# Patient Record
(demographics unavailable — no encounter records)

---

## 2024-12-04 NOTE — PHYSICAL EXAM
[Awake] : awake [Alert] : alert [Acute Distress] : no acute distress [Mass] : no breast mass [Tender] : no tenderness [de-identified] : right groin abcess with small amount of pustular exudate elicited when squeezing but then sanguinous

## 2024-12-04 NOTE — PHYSICAL EXAM
[Awake] : awake [Alert] : alert [Acute Distress] : no acute distress [Mass] : no breast mass [Tender] : no tenderness [de-identified] : right groin abcess with small amount of pustular exudate elicited when squeezing but then sanguinous

## 2024-12-04 NOTE — CHIEF COMPLAINT
[FreeTextEntry1] : Nayla is a 47 y/o  who presents today wiith multiple complaints as follows:  1) desires evaluation of left breast abscess. She had a diagnostic right breast mammo and sono on 24 that was normal. 2) pain to her right axilla: she is s/p a diagnostic b/l mammo and sono 3/8/24 and reports drinking 3-4 cups of coffee per day 3) a right groin abcess that she went to  ED for on 24 where she was treated with Keflex 500mg q/6hours for about 6 days. She was told to f/u with gyn if it did not fully resolve.

## 2024-12-14 NOTE — PLAN
[FreeTextEntry1] : Pt will go to the ER at [facility] for further evaluation

## 2024-12-14 NOTE — HISTORY OF PRESENT ILLNESS
[FreeTextEntry8] : Pt went to [place] ER on 11/29/24 with a right groin abscess. She was prescrived cephalexin for 1 week and discharged. She had a follow-up with GYN on 12/4/24. She was told to use warm compresses.. She is here today c/o pain in the right groin and leg. She feels that it is "bone pain"

## 2024-12-14 NOTE — PHYSICAL EXAM
[No Acute Distress] : no acute distress [Well Nourished] : well nourished [Well Developed] : well developed [Well-Appearing] : well-appearing [Normal Sclera/Conjunctiva] : normal sclera/conjunctiva [PERRL] : pupils equal round and reactive to light [EOMI] : extraocular movements intact [Normal Outer Ear/Nose] : the outer ears and nose were normal in appearance [Normal Oropharynx] : the oropharynx was normal [No JVD] : no jugular venous distention [No Lymphadenopathy] : no lymphadenopathy [Supple] : supple [Thyroid Normal, No Nodules] : the thyroid was normal and there were no nodules present [No Respiratory Distress] : no respiratory distress  [No Accessory Muscle Use] : no accessory muscle use [Clear to Auscultation] : lungs were clear to auscultation bilaterally [Normal Rate] : normal rate  [Regular Rhythm] : with a regular rhythm [Normal S1, S2] : normal S1 and S2 [No Murmur] : no murmur heard [No Carotid Bruits] : no carotid bruits [No Abdominal Bruit] : a ~M bruit was not heard ~T in the abdomen [No Varicosities] : no varicosities [Pedal Pulses Present] : the pedal pulses are present [No Edema] : there was no peripheral edema [No Palpable Aorta] : no palpable aorta [No Extremity Clubbing/Cyanosis] : no extremity clubbing/cyanosis [Soft] : abdomen soft [Non Tender] : non-tender [Non-distended] : non-distended [No Masses] : no abdominal mass palpated [No HSM] : no HSM [Normal Bowel Sounds] : normal bowel sounds [Normal Posterior Cervical Nodes] : no posterior cervical lymphadenopathy [Normal Anterior Cervical Nodes] : no anterior cervical lymphadenopathy [No CVA Tenderness] : no CVA  tenderness [No Spinal Tenderness] : no spinal tenderness [No Joint Swelling] : no joint swelling [Grossly Normal Strength/Tone] : grossly normal strength/tone [No Rash] : no rash [Coordination Grossly Intact] : coordination grossly intact [No Focal Deficits] : no focal deficits [Normal Gait] : normal gait [Deep Tendon Reflexes (DTR)] : deep tendon reflexes were 2+ and symmetric [Normal Affect] : the affect was normal [Normal Insight/Judgement] : insight and judgment were intact [de-identified] : Remnant of abscess right groin. Female medical assistant present in  room during exam

## 2025-02-06 NOTE — ASSESSMENT
[FreeTextEntry1] : EKG NSR Stress urinary incontinence Diaphoresis possible due to viral illness Right leg pain on and off for months

## 2025-02-06 NOTE — HEALTH RISK ASSESSMENT
[Fair] : ~his/her~ current health as fair  [Good] : ~his/her~  mood as  good [No] : No [Never] : Never

## 2025-02-06 NOTE — PLAN
[FreeTextEntry1] : Check labs Referred to Dr. Blackmon, Urology, regarding urinary incontinence Referred to [facility], Orthopedics,  regarding chronic right leg intermittent pain Referred to Dr Glover, Colorectal, for screening colonoscopy Chest X-ray regarding diaphoresis Yearly mammogram and GYN exam.

## 2025-02-06 NOTE — HISTORY OF PRESENT ILLNESS
[FreeTextEntry1] : Here for CPE Pt woke up sweaty 3 mornings during the past week. Improved this morning. Pt is c/o intermittent pain in right leg for months Pt is c/o urinary incontinence when she coughs [de-identified] : Never a smoker. No alcohol. Last mammogram and GYN check-up last year. Hasn't had a colonoscopy. Exercises twice per week
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
## 2025-02-13 NOTE — PHYSICAL EXAM
[FreeTextEntry3] : General: well appearing person in nad, alert, pleasant; here with her children Skin: Mild thinning at central part with slight miniaturization of hair follicles. No perifollicular inflammation or scarring. Few growing hairs noted on dermoscopy Yellow scale throughout the scalp R arm with healing erosion BLE with xerosis, minimal eczematous thin plaques R inguinal fold with scar
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
## 2025-05-15 NOTE — HISTORY OF PRESENT ILLNESS
[FreeTextEntry1] : 48 yo here for bad vaginal odor. SHe has had this in the past a yr ago, this has been for 10 days . She has the copper iud.also some itching.  She also states that she is having mixed incontinance.

## 2025-05-15 NOTE — PHYSICAL EXAM
[Chaperone Declined] : Chaperone offered however refused by patient, [Appropriately responsive] : appropriately responsive [Alert] : alert [No Acute Distress] : no acute distress [Soft] : soft [Non-tender] : non-tender [Oriented x3] : oriented x3 [Labia Majora] : normal [Labia Minora] : normal [Cystocele] : a cystocele [IUD String] : an IUD string was noted [Normal] : normal [FreeTextEntry4] : brownish discharge [FreeTextEntry6] : slightly enlarged